# Patient Record
Sex: FEMALE | Race: BLACK OR AFRICAN AMERICAN | NOT HISPANIC OR LATINO | Employment: OTHER | URBAN - METROPOLITAN AREA
[De-identification: names, ages, dates, MRNs, and addresses within clinical notes are randomized per-mention and may not be internally consistent; named-entity substitution may affect disease eponyms.]

---

## 2021-10-28 ENCOUNTER — TELEPHONE (OUTPATIENT)
Dept: BARIATRICS | Facility: CLINIC | Age: 71
End: 2021-10-28
Payer: MEDICAID

## 2021-12-01 ENCOUNTER — TELEPHONE (OUTPATIENT)
Dept: NEUROSURGERY | Facility: CLINIC | Age: 71
End: 2021-12-01
Payer: MEDICAID

## 2021-12-09 ENCOUNTER — HOSPITAL ENCOUNTER (OUTPATIENT)
Dept: RADIOLOGY | Facility: OTHER | Age: 71
Discharge: HOME | End: 2021-12-09
Attending: STUDENT IN AN ORGANIZED HEALTH CARE EDUCATION/TRAINING PROGRAM
Payer: MEDICARE

## 2021-12-09 ENCOUNTER — TELEPHONE (OUTPATIENT)
Dept: SPINE | Facility: CLINIC | Age: 71
End: 2021-12-09

## 2021-12-09 ENCOUNTER — OFFICE VISIT (OUTPATIENT)
Dept: SPINE | Facility: CLINIC | Age: 71
End: 2021-12-09
Payer: MEDICARE

## 2021-12-09 DIAGNOSIS — M48.061 DEGENERATIVE LUMBAR SPINAL STENOSIS: ICD-10-CM

## 2021-12-09 DIAGNOSIS — M48.061 DEGENERATIVE LUMBAR SPINAL STENOSIS: Primary | ICD-10-CM

## 2021-12-09 PROCEDURE — 72082 XR SCOLIOSIS COMPLETE: ICD-10-PCS | Mod: 26,,, | Performed by: RADIOLOGY

## 2021-12-09 PROCEDURE — 72110 X-RAY EXAM L-2 SPINE 4/>VWS: CPT | Mod: TC,FY

## 2021-12-09 PROCEDURE — 99203 PR OFFICE/OUTPT VISIT, NEW, LEVL III, 30-44 MIN: ICD-10-PCS | Mod: S$GLB,,, | Performed by: STUDENT IN AN ORGANIZED HEALTH CARE EDUCATION/TRAINING PROGRAM

## 2021-12-09 PROCEDURE — 72082 X-RAY EXAM ENTIRE SPI 2/3 VW: CPT | Mod: TC,FY

## 2021-12-09 PROCEDURE — 72110 X-RAY EXAM L-2 SPINE 4/>VWS: CPT | Mod: 26,59,, | Performed by: RADIOLOGY

## 2021-12-09 PROCEDURE — 99213 OFFICE O/P EST LOW 20 MIN: CPT | Mod: PBBFAC | Performed by: STUDENT IN AN ORGANIZED HEALTH CARE EDUCATION/TRAINING PROGRAM

## 2021-12-09 PROCEDURE — 72082 X-RAY EXAM ENTIRE SPI 2/3 VW: CPT | Mod: 26,,, | Performed by: RADIOLOGY

## 2021-12-09 PROCEDURE — 99203 OFFICE O/P NEW LOW 30 MIN: CPT | Mod: S$GLB,,, | Performed by: STUDENT IN AN ORGANIZED HEALTH CARE EDUCATION/TRAINING PROGRAM

## 2021-12-09 PROCEDURE — 99999 PR PBB SHADOW E&M-EST. PATIENT-LVL III: ICD-10-PCS | Mod: PBBFAC,,, | Performed by: STUDENT IN AN ORGANIZED HEALTH CARE EDUCATION/TRAINING PROGRAM

## 2021-12-09 PROCEDURE — 99999 PR PBB SHADOW E&M-EST. PATIENT-LVL III: CPT | Mod: PBBFAC,,, | Performed by: STUDENT IN AN ORGANIZED HEALTH CARE EDUCATION/TRAINING PROGRAM

## 2021-12-09 PROCEDURE — 72110 XR LUMBAR SPINE 5 VIEW WITH FLEX AND EXT: ICD-10-PCS | Mod: 26,59,, | Performed by: RADIOLOGY

## 2021-12-09 RX ORDER — IBUPROFEN 800 MG/1
TABLET ORAL
COMMUNITY
Start: 2021-11-12

## 2021-12-09 RX ORDER — METOPROLOL SUCCINATE 50 MG/1
50 TABLET, EXTENDED RELEASE ORAL
COMMUNITY

## 2021-12-09 RX ORDER — TOBRAMYCIN 1.2 G/30ML
INJECTION, POWDER, LYOPHILIZED, FOR SOLUTION INTRAVENOUS
COMMUNITY
Start: 2021-07-22

## 2021-12-09 RX ORDER — TRAMADOL HYDROCHLORIDE 50 MG/1
TABLET ORAL
COMMUNITY
Start: 2021-10-01

## 2021-12-09 RX ORDER — AMLODIPINE BESYLATE 10 MG/1
TABLET ORAL
COMMUNITY
Start: 2021-07-06

## 2021-12-09 RX ORDER — HYDROCORTISONE 25 MG/G
CREAM TOPICAL
COMMUNITY
Start: 2021-07-29

## 2021-12-09 RX ORDER — DICLOFENAC SODIUM 10 MG/G
GEL TOPICAL
COMMUNITY
Start: 2021-10-01

## 2021-12-09 RX ORDER — FLUTICASONE PROPIONATE 50 MCG
SPRAY, SUSPENSION (ML) NASAL
COMMUNITY
Start: 2021-11-24

## 2021-12-09 RX ORDER — ACYCLOVIR 800 MG/1
TABLET ORAL
COMMUNITY
Start: 2021-07-29

## 2021-12-09 RX ORDER — HYDROCHLOROTHIAZIDE 25 MG/1
TABLET ORAL
COMMUNITY
Start: 2021-07-29

## 2021-12-09 RX ORDER — LOVASTATIN 20 MG/1
20 TABLET ORAL
COMMUNITY

## 2021-12-09 RX ORDER — MELOXICAM 7.5 MG/1
TABLET ORAL
COMMUNITY
Start: 2021-11-24

## 2021-12-09 RX ORDER — DICLOFENAC POTASSIUM 50 MG/1
50 TABLET, FILM COATED ORAL 3 TIMES DAILY PRN
COMMUNITY

## 2021-12-09 RX ORDER — DOXAZOSIN 2 MG/1
2 TABLET ORAL
COMMUNITY

## 2021-12-14 ENCOUNTER — TELEPHONE (OUTPATIENT)
Dept: BARIATRICS | Facility: CLINIC | Age: 71
End: 2021-12-14
Payer: MEDICAID

## 2021-12-29 ENCOUNTER — TELEPHONE (OUTPATIENT)
Dept: NEUROSURGERY | Facility: CLINIC | Age: 71
End: 2021-12-29
Payer: MEDICAID

## 2022-01-04 ENCOUNTER — CLINICAL SUPPORT (OUTPATIENT)
Dept: REHABILITATION | Facility: HOSPITAL | Age: 72
End: 2022-01-04
Attending: STUDENT IN AN ORGANIZED HEALTH CARE EDUCATION/TRAINING PROGRAM
Payer: MEDICAID

## 2022-01-04 DIAGNOSIS — M48.061 DEGENERATIVE LUMBAR SPINAL STENOSIS: ICD-10-CM

## 2022-01-04 DIAGNOSIS — R29.898 DECREASED STRENGTH OF LOWER EXTREMITY: ICD-10-CM

## 2022-01-04 DIAGNOSIS — M53.86 DECREASED RANGE OF MOTION OF LUMBAR SPINE: Primary | ICD-10-CM

## 2022-01-04 PROCEDURE — 97161 PT EVAL LOW COMPLEX 20 MIN: CPT | Mod: PO

## 2022-01-04 NOTE — PLAN OF CARE
[facility]  Physical Therapy Initial Evaluation    Date: 1/4/2022   Name: Vicky Marie  Clinic Number: [id number]    Therapy Diagnosis:   Encounter Diagnoses   Name Primary?    Degenerative lumbar spinal stenosis     Decreased range of motion of lumbar spine Yes    Decreased strength of lower extremity      Physician: Charlie Cobb DO    Physician Orders: PT Eval and Treat   Medical Diagnosis from Referral: M48.061 (ICD-10-CM) - Degenerative lumbar spinal stenosis  Evaluation Date: 1/4/2022  Authorization Period Expiration: 12/9/2022  Plan of Care Expiration: 3/4/2022  Visit # / Visits authorized: 1/1    Time In: 1257p  Time Out: 0138p  Total Appointment Time (timed & untimed codes): 45 minutes    Precautions: Standard    Subjective   Date of onset: years ago   History of current condition - Vicky reports: she has been having low back pain for approximately . Patient does report a specific mechanism of injury. Pt states she fell when a chair slipped out behind her. Pt states the pain runs down to the back of the R calf. Pt states that standing and walking increase the pain. Pt states that sitting it hurts sometimes too. Pt states the R leg seems a little weaker and he fingers have started to tingle recently. Easing factors include none. Patient denies any bowel or bladder incontinence. Pt states she has not had any falls since that one last year. Pt states she tried PT in the past but that did not help.      Medical History:   Past Medical History:   Diagnosis Date    Arthritis     COPD (chronic obstructive pulmonary disease)     Hypertension        Surgical History:   Vicky Marie  has a past surgical history that includes choleycystec and Hysterectomy.    Medications:   Vicky has a current medication list which includes the following prescription(s): acyclovir, amlodipine, diclofenac, diclofenac sodium, doxazosin, fluticasone propionate, hydrochlorothiazide,  hydrocortisone, ibuprofen, lovastatin, meloxicam, metoprolol succinate, tobramycin, and tramadol.    Allergies:   Review of patient's allergies indicates:   Allergen Reactions    Ciprofloxacin Hives    Flavoxate Hives    Nuts [tree nut]      PEANUTS    Sulfa (sulfonamide antibiotics) Hives    Trimethoprim Hives        Imaging:   (12/9/2021) Diagnostic Results:  MRI L from OSH: severe central canal stenosis at L3/4, moderate bilateral lateral recess stenosis secondary to facet arthropathy.  Severe central canal stenosis at L4/5 and severe right lateral recess stenosis.  Moderate left lateral recess stenosis.  Likely left synovial facet cyst.  L5/S1 mild central canal stenosis with bilateral moderate lateral recess stenosis.    Prior Therapy: Y, for the same problem  Social History: lives with family   Occupation: none  Prior Level of Function: I  Current Level of Function: I    Pain:  Current: 8/10;   Location: low back  Description: gnawing; cramping    Pt's goals: to walk better and to lose some weight   Objective   Observation: calm and cooperative      Gait:   SPC in the R hand   Decreased hip EXT   Decreased step length   Compensated trendelenburg to the R     Posture:     Anterior pelvic tilt   Increased lumbar lordosis   Hinge point with skin fold at lower lumbar spine    Lumbar Range of Motion:    Degrees Pain   Flexion 25% limited    No pain      Extension 50% limited    Pain    Left Side Bending 20 degrees  Pain along L side of back    Right Side Bending 20 degrees No pain     Lower Extremity Strength  Right LE  Left LE    Knee extension: 4/5 Knee extension: 4/5   Knee flexion: 4/5 Knee flexion: 4/5   Hip flexion: 3+/5 Hip flexion: 3+/5   Hip extension:  3+/5 Hip extension: 3+/5   Hip abduction: 4-/5 Hip abduction: 4-/5   Hip adduction: 4/5 Hip adduction 4/5   Ankle dorsiflexion: 5/5 Ankle dorsiflexion: 5/5   Ankle plantarflexion: 4+/5 Ankle plantarflexion: 4+/5       Special Tests:  - Flexion  preference: yes  - Extension preference: no  - SLR test: (+) RLE  - Slump test: (+) RLE  - Cross SLR test: NT  - Quadrant test: NT    Joint Mobility: not tested    Palpation: no tenderness to lumbar paraspinals     Sensation: WNL    Functional Testins chair rise --> 6 reps without AD or UE use.    SLB --> unable to perform      Limitation/Restriction for FOTO Lumbar Survey    Therapist reviewed FOTO scores for Vicky Marie on 2022.   FOTO documents entered into [facility] - see Media section.    Not tested this visit         TREATMENT   Treatment Time In: 128p  Treatment Time Out: 138p  Total Treatment time (time-based codes) separate from Evaluation: 10 minutes    Vicky received therapeutic exercises to develop strength, endurance, ROM, flexibility, posture, and core stabilization for 10 minutes including:   SL Clamshells 2 x 10    Standing Hip Ext leaning on mat x 10 B    Slump nerve glides 2 x 20 RLE only    Education - HEP/prognosis     Home Exercises and Patient Education Provided    Education provided:   - HEP  - Prognosis/POC    Written Home Exercises Provided: yes.  Exercises were reviewed and Vicky was able to demonstrate them prior to the end of the session.  Vicky demonstrated good  understanding of the education provided.     See EMR under Patient Instructions for exercises provided 2022.    Assessment   Vicky is a 71 y.o. female referred to outpatient Physical Therapy with a medical diagnosis of Degenerative lumbar spinal stenosis .Pt presents with painful and limited lumbar AROM, abnormal posture, adverse neural tension, BLE weakness, and functional limitations of poor gait mechanics and poor motor performance in the 30s chair rise. Pt reported decreased pain and demonstrated an improved gait pattern with less R trunk lean with the cane placement being switch from her R hand to her L hand. Pt presents with symptoms of lumbar stenosis given subjective and flexion bias. Pt's  increase weight and poor posture likely are exacerbating this issue. HEP provided to reduce neural tension and increase glute med strength. Patient would benefit from skilled PT to improve mobility, strength, and improve QOL.     Pt to be seen 2x/week for 8x weeks     Pt prognosis is Good.   Pt will benefit from skilled outpatient Physical Therapy to address the deficits stated above and in the chart below, provide pt/family education, and to maximize pt's level of independence.     Plan of care discussed with patient: Yes  Pt's spiritual, cultural and educational needs considered and patient is agreeable to the plan of care and goals as stated below:     Anticipated Barriers for therapy: chronicity of pt's symptoms     Medical Necessity is demonstrated by the following  History  Co-morbidities and personal factors that may impact the plan of care Co-morbidities:   COPD/asthma and HTN    Personal Factors:   no deficits     moderate   Examination  Body Structures and Functions, activity limitations and participation restrictions that may impact the plan of care Body Regions:   lower extremities  trunk    Body Systems:    gross symmetry  ROM  strength  gross coordinated movement  gait  transfers  motor control    Participation Restrictions:   n/a    Activity limitations:   no deficits    General Tasks and Commands  no deficits    Communication  no deficits    Mobility  walking    Self care  no deficits    Domestic Life  no deficits    Interactions/Relationships  no deficits    Life Areas  no deficits    Community and Social Life  community life  recreation and leisure         low   Clinical Presentation stable and uncomplicated low   Decision Making/ Complexity Score: low     Goals:  Short Term Goals: 4 weeks  1. Patient will be independent with HEP in order to supplement pain free lumbar ROM - PROGRESSING, NOT MET  2. Pt will report a negative on the slump test and SLR to demonstrate decreased adverse neural tension  - PROGRESSING, NOT MET  3. Patient will improve 30s chair rise to 9 reps to demo improved functional performance and endurance- PROGRESSING, NOT MET     Long Term Goals: 8 weeks   1. Pt will improve lumbar FOTO survey to </= predicted% limited in order to return to ADLs without limitation - PROGRESSING, NOT MET  2. Patient will improve 30s chair rise to 12 reps in order to further demonstrate improved motor performance - PROGRESSING, NOT MET  3. Pt will report less than 3/10 pain with walking to demonstrate improved walking tolerance. - PROGRESSING, NOT MET    Plan   Plan of care Certification: 1/4/2022 to 3/4/2021.    Outpatient Physical Therapy 2 times weekly for 8 weeks to include the following interventions: Electrical Stimulation ,, Gait Training, Manual Therapy, Moist Heat/ Ice, Neuromuscular Re-ed, Patient Education, Therapeutic Activities and Therapeutic Exercise.     Vazquez Oden, PT

## 2022-02-04 ENCOUNTER — DOCUMENTATION ONLY (OUTPATIENT)
Dept: REHABILITATION | Facility: HOSPITAL | Age: 72
End: 2022-02-04
Payer: MEDICAID

## 2022-02-04 NOTE — PROGRESS NOTES
PT called patient after patient had not arrived for 10 minutes after visit was scheduled to begin.   Communication: Spoke with patient, to which patient would like all of her appointments cancelled. Patient has no-showed/cancelled last 6 appointments.   Notes: Remaining appointments cancelled on 2/4/2022.    Yaritza Langley PT,DPT, OCS  License Number: [id number]

## 2022-02-17 ENCOUNTER — TELEPHONE (OUTPATIENT)
Dept: NEUROSURGERY | Facility: CLINIC | Age: 72
End: 2022-02-17
Payer: MEDICAID

## 2022-02-17 NOTE — TELEPHONE ENCOUNTER
Called patient and she needs to postpone appt until March. REscheduled as below agreed by patient.    Future Appointments   Date Time Provider Department Center   3/31/2022 11:00 AM Charlie Cobb,  [facility]     ----- Message from Pedro Dyson sent at 2/17/2022  9:54 AM CST -----      Name of Who is Calling: LUIS EDUARDO PANG [[id number]]      What is the request in detail: Pt called to reschedule appt.Please contact to further discuss and advise.          Can the clinic reply by [facility]: N      What Number [facility] Back [facility]: [phone]

## 2022-03-31 ENCOUNTER — OFFICE VISIT (OUTPATIENT)
Dept: SPINE | Facility: CLINIC | Age: 72
End: 2022-03-31
Payer: MEDICARE

## 2022-03-31 VITALS
SYSTOLIC BLOOD PRESSURE: 138 MMHG | HEIGHT: 62 IN | WEIGHT: 293 LBS | BODY MASS INDEX: 53.92 KG/M2 | DIASTOLIC BLOOD PRESSURE: 80 MMHG | HEART RATE: 58 BPM

## 2022-03-31 DIAGNOSIS — M48.061 DEGENERATIVE LUMBAR SPINAL STENOSIS: Primary | ICD-10-CM

## 2022-03-31 PROCEDURE — 1159F PR MEDICATION LIST DOCUMENTED IN MEDICAL RECORD: ICD-10-PCS | Mod: CPTII,S$GLB,, | Performed by: STUDENT IN AN ORGANIZED HEALTH CARE EDUCATION/TRAINING PROGRAM

## 2022-03-31 PROCEDURE — 99213 OFFICE O/P EST LOW 20 MIN: CPT | Mod: PBBFAC | Performed by: STUDENT IN AN ORGANIZED HEALTH CARE EDUCATION/TRAINING PROGRAM

## 2022-03-31 PROCEDURE — 3288F FALL RISK ASSESSMENT DOCD: CPT | Mod: CPTII,S$GLB,, | Performed by: STUDENT IN AN ORGANIZED HEALTH CARE EDUCATION/TRAINING PROGRAM

## 2022-03-31 PROCEDURE — 1101F PR PT FALLS ASSESS DOC 0-1 FALLS W/OUT INJ PAST YR: ICD-10-PCS | Mod: CPTII,S$GLB,, | Performed by: STUDENT IN AN ORGANIZED HEALTH CARE EDUCATION/TRAINING PROGRAM

## 2022-03-31 PROCEDURE — 1125F PR PAIN SEVERITY QUANTIFIED, PAIN PRESENT: ICD-10-PCS | Mod: CPTII,S$GLB,, | Performed by: STUDENT IN AN ORGANIZED HEALTH CARE EDUCATION/TRAINING PROGRAM

## 2022-03-31 PROCEDURE — 99213 PR OFFICE/OUTPT VISIT, EST, LEVL III, 20-29 MIN: ICD-10-PCS | Mod: S$GLB,,, | Performed by: STUDENT IN AN ORGANIZED HEALTH CARE EDUCATION/TRAINING PROGRAM

## 2022-03-31 PROCEDURE — 3079F DIAST BP 80-89 MM HG: CPT | Mod: CPTII,S$GLB,, | Performed by: STUDENT IN AN ORGANIZED HEALTH CARE EDUCATION/TRAINING PROGRAM

## 2022-03-31 PROCEDURE — 1101F PT FALLS ASSESS-DOCD LE1/YR: CPT | Mod: CPTII,S$GLB,, | Performed by: STUDENT IN AN ORGANIZED HEALTH CARE EDUCATION/TRAINING PROGRAM

## 2022-03-31 PROCEDURE — 3075F SYST BP GE 130 - 139MM HG: CPT | Mod: CPTII,S$GLB,, | Performed by: STUDENT IN AN ORGANIZED HEALTH CARE EDUCATION/TRAINING PROGRAM

## 2022-03-31 PROCEDURE — 3008F PR BODY MASS INDEX (BMI) DOCUMENTED: ICD-10-PCS | Mod: CPTII,S$GLB,, | Performed by: STUDENT IN AN ORGANIZED HEALTH CARE EDUCATION/TRAINING PROGRAM

## 2022-03-31 PROCEDURE — 3288F PR FALLS RISK ASSESSMENT DOCUMENTED: ICD-10-PCS | Mod: CPTII,S$GLB,, | Performed by: STUDENT IN AN ORGANIZED HEALTH CARE EDUCATION/TRAINING PROGRAM

## 2022-03-31 PROCEDURE — 1159F MED LIST DOCD IN RCRD: CPT | Mod: CPTII,S$GLB,, | Performed by: STUDENT IN AN ORGANIZED HEALTH CARE EDUCATION/TRAINING PROGRAM

## 2022-03-31 PROCEDURE — 3079F PR MOST RECENT DIASTOLIC BLOOD PRESSURE 80-89 MM HG: ICD-10-PCS | Mod: CPTII,S$GLB,, | Performed by: STUDENT IN AN ORGANIZED HEALTH CARE EDUCATION/TRAINING PROGRAM

## 2022-03-31 PROCEDURE — 99999 PR PBB SHADOW E&M-EST. PATIENT-LVL III: ICD-10-PCS | Mod: PBBFAC,,, | Performed by: STUDENT IN AN ORGANIZED HEALTH CARE EDUCATION/TRAINING PROGRAM

## 2022-03-31 PROCEDURE — 99999 PR PBB SHADOW E&M-EST. PATIENT-LVL III: CPT | Mod: PBBFAC,,, | Performed by: STUDENT IN AN ORGANIZED HEALTH CARE EDUCATION/TRAINING PROGRAM

## 2022-03-31 PROCEDURE — 1125F AMNT PAIN NOTED PAIN PRSNT: CPT | Mod: CPTII,S$GLB,, | Performed by: STUDENT IN AN ORGANIZED HEALTH CARE EDUCATION/TRAINING PROGRAM

## 2022-03-31 PROCEDURE — 3008F BODY MASS INDEX DOCD: CPT | Mod: CPTII,S$GLB,, | Performed by: STUDENT IN AN ORGANIZED HEALTH CARE EDUCATION/TRAINING PROGRAM

## 2022-03-31 PROCEDURE — 3075F PR MOST RECENT SYSTOLIC BLOOD PRESS GE 130-139MM HG: ICD-10-PCS | Mod: CPTII,S$GLB,, | Performed by: STUDENT IN AN ORGANIZED HEALTH CARE EDUCATION/TRAINING PROGRAM

## 2022-03-31 PROCEDURE — 99213 OFFICE O/P EST LOW 20 MIN: CPT | Mod: S$GLB,,, | Performed by: STUDENT IN AN ORGANIZED HEALTH CARE EDUCATION/TRAINING PROGRAM

## 2022-03-31 NOTE — PROGRESS NOTES
Neurosurgery  Established Patient    SUBJECTIVE:     History of Present Illness:  71 F presents for eval of back and RLE leg pain that has worsened over the past year.  Back and leg symptoms are similar in severity.  Her leg pain radiates down right buttock into right posterior calf.  She has no left leg pain.  Laying flat and walking exacerbate the the back and RLE pain.  No positions improve the back pain.  She hasn't tried PT or pain management yet.  She is a nonsmoker.  No bowel/bladder incontinence.  She denies dropping items from hands or UE radiculopathy.    Interval fu 3/31/2022: Pt has seen pain mgmt and had JENNIFER with resolution of her back and RLE radicular pain.  She still has right knee pain which she is seeing pain mgmt for.  She started PT but only went to a few sessions and stopped after her daughter was diagnosed with cancer.    Review of patient's allergies indicates:   Allergen Reactions    Ciprofloxacin Hives    Flavoxate Hives    Nuts [tree nut]      PEANUTS    Sulfa (sulfonamide antibiotics) Hives    Trimethoprim Hives       Current Outpatient Medications   Medication Sig Dispense Refill    acyclovir (ZOVIRAX) 800 MG Tab       amLODIPine (NORVASC) 10 MG tablet       diclofenac (CATAFLAM) 50 MG tablet Take 50 mg by mouth 3 (three) times daily as needed.      diclofenac sodium (VOLTAREN) 1 % Gel       doxazosin (CARDURA) 2 MG tablet Take 2 mg by mouth.      fluticasone propionate (FLONASE) 50 mcg/actuation nasal spray       hydroCHLOROthiazide (HYDRODIURIL) 25 MG tablet       hydrocortisone 2.5 % cream       ibuprofen (ADVIL,MOTRIN) 800 MG tablet       lovastatin (MEVACOR) 20 MG tablet Take 20 mg by mouth.      meloxicam (MOBIC) 7.5 MG tablet       metoprolol succinate (TOPROL-XL) 50 MG 24 hr tablet Take 50 mg by mouth.      tobramycin (NEBCIN) 1.2 gram injection       traMADoL (ULTRAM) 50 mg tablet        No current facility-administered medications for this visit.       Past  "Medical History:   Diagnosis Date    Arthritis     COPD (chronic obstructive pulmonary disease)     Hypertension      Past Surgical History:   Procedure Laterality Date    choleycystec      HYSTERECTOMY       Family History    None       Social History     Socioeconomic History    Marital status: Unknown   Tobacco Use    Smoking status: Former Smoker     Types: Cigarettes     Quit date: 2007     Years since quitting: 15.2    Smokeless tobacco: Never Used   Substance and Sexual Activity    Alcohol use: Never       Review of Systems   14 point ROS was negative    OBJECTIVE:     Vital Signs  Pulse: (!) 58  BP: 138/80  Pain Score:   9  Height: 5' 2" (157.5 cm)  Weight: 135.2 kg (298 lb 1 oz)  Body mass index is 54.52 kg/m².    Neurosurgery Physical Exam  Constitutional: She appears well-developed and well-nourished.      Eyes: Pupils are equal, round, and reactive to light.      Cardiovascular: Normal rate and regular rhythm.      Abdominal: Soft.     Psych/Behavior: She is alert. She is oriented to person, place, and time. She has a normal mood and affect.     Musculoskeletal: Gait is abnormal.        Neck: Range of motion is full.        Back: Range of motion is limited.        Right Upper Extremities: Muscle strength is 5/5.        Left Upper Extremities: Muscle strength is 5/5.       Right Lower Extremities: Muscle strength is 5/5.        Left Lower Extremities: Muscle strength is 5/5.     Neurological:        Coordination: She has abnormal tandem walking coordination. She has a normal Romberg Test.        Sensory: There is no sensory deficit in the trunk. There is no sensory deficit in the extremities.        DTRs: DTRs are DTRS NORMAL AND SYMMETRICnormal and symmetric.        Cranial nerves: Cranial nerve(s) II, III, IV, V, VI, VII, VIII, IX, X, XI and XII are intact.      Morbid obesity     Trace whitley's bilaterally     TTP throughout posterior spine and bilateral lumbosacral jxn.  Diagnostic " Results:  Flex/ex L: spondy at L3/4, 4/5 grade I.  Unchanged on dynamic views.  Scoliosis: LL of 37 deg, PI of 57 deg, PT 27 deg, SVA of 8 cm  CT L: ankylosing spondylitis of thoracic spine.  Facet arthropathy right greater than left from L3/4 to 5/S1.  Reviewed    ASSESSMENT/PLAN:     71 F with severe central canal stenosis at L3/4, 4/5, right L5 radiculopathy and axial back pain.  Her back and RLE radicular pain have improved with pain mgmt.  She hasn't completed PT yet.  She is also interested in losing weight and has tried to get appt with weight loss physician but has been unsuccessful.  -Bariatric medicine referral  -Fu in 12 weeks  -Continue PT

## 2023-09-01 ENCOUNTER — OFFICE VISIT (OUTPATIENT)
Dept: OTOLARYNGOLOGY | Facility: CLINIC | Age: 73
End: 2023-09-01
Payer: MEDICARE

## 2023-09-01 ENCOUNTER — CLINICAL SUPPORT (OUTPATIENT)
Dept: AUDIOLOGY | Facility: CLINIC | Age: 73
End: 2023-09-01
Payer: MEDICARE

## 2023-09-01 DIAGNOSIS — H90.3 ASYMMETRICAL SENSORINEURAL HEARING LOSS: Primary | ICD-10-CM

## 2023-09-01 DIAGNOSIS — H93.12 TINNITUS OF LEFT EAR: Primary | ICD-10-CM

## 2023-09-01 DIAGNOSIS — H93.12 TINNITUS OF LEFT EAR: ICD-10-CM

## 2023-09-01 DIAGNOSIS — H93.A2 PULSATILE TINNITUS, LEFT EAR: ICD-10-CM

## 2023-09-01 DIAGNOSIS — H65.92 FLUID LEVEL BEHIND TYMPANIC MEMBRANE OF LEFT EAR: ICD-10-CM

## 2023-09-01 PROCEDURE — 99999 PR PBB SHADOW E&M-EST. PATIENT-LVL III: ICD-10-PCS | Mod: PBBFAC,,, | Performed by: OTOLARYNGOLOGY

## 2023-09-01 PROCEDURE — 99999 PR PBB SHADOW E&M-EST. PATIENT-LVL I: CPT | Mod: PBBFAC,,,

## 2023-09-01 PROCEDURE — 92557 PR COMPREHENSIVE HEARING TEST: ICD-10-PCS | Mod: S$GLB,,,

## 2023-09-01 PROCEDURE — 99203 PR OFFICE/OUTPT VISIT, NEW, LEVL III, 30-44 MIN: ICD-10-PCS | Mod: S$GLB,,, | Performed by: OTOLARYNGOLOGY

## 2023-09-01 PROCEDURE — 1101F PR PT FALLS ASSESS DOC 0-1 FALLS W/OUT INJ PAST YR: ICD-10-PCS | Mod: CPTII,S$GLB,, | Performed by: OTOLARYNGOLOGY

## 2023-09-01 PROCEDURE — 3288F FALL RISK ASSESSMENT DOCD: CPT | Mod: CPTII,S$GLB,, | Performed by: OTOLARYNGOLOGY

## 2023-09-01 PROCEDURE — 92567 TYMPANOMETRY: CPT | Mod: S$GLB,,,

## 2023-09-01 PROCEDURE — 1159F MED LIST DOCD IN RCRD: CPT | Mod: CPTII,S$GLB,, | Performed by: OTOLARYNGOLOGY

## 2023-09-01 PROCEDURE — 1101F PT FALLS ASSESS-DOCD LE1/YR: CPT | Mod: CPTII,S$GLB,, | Performed by: OTOLARYNGOLOGY

## 2023-09-01 PROCEDURE — 1159F PR MEDICATION LIST DOCUMENTED IN MEDICAL RECORD: ICD-10-PCS | Mod: CPTII,S$GLB,, | Performed by: OTOLARYNGOLOGY

## 2023-09-01 PROCEDURE — 92557 COMPREHENSIVE HEARING TEST: CPT | Mod: S$GLB,,,

## 2023-09-01 PROCEDURE — 99999 PR PBB SHADOW E&M-EST. PATIENT-LVL III: CPT | Mod: PBBFAC,,, | Performed by: OTOLARYNGOLOGY

## 2023-09-01 PROCEDURE — 92567 PR TYMPA2METRY: ICD-10-PCS | Mod: S$GLB,,,

## 2023-09-01 PROCEDURE — 99999 PR PBB SHADOW E&M-EST. PATIENT-LVL I: ICD-10-PCS | Mod: PBBFAC,,,

## 2023-09-01 PROCEDURE — 3288F PR FALLS RISK ASSESSMENT DOCUMENTED: ICD-10-PCS | Mod: CPTII,S$GLB,, | Performed by: OTOLARYNGOLOGY

## 2023-09-01 PROCEDURE — 99203 OFFICE O/P NEW LOW 30 MIN: CPT | Mod: S$GLB,,, | Performed by: OTOLARYNGOLOGY

## 2023-09-01 NOTE — PROGRESS NOTES
Vicky Marie, a 72 y.o. female, was seen today in the clinic for an audiologic evaluation.  Per review of medical record, an audiogram completed at an outside facility in 2015 revealed mild sensorineural hearing loss at 4-8kHz in the right ear and mild to moderate sensorineural hearing loss in the left ear. Today patient reported continued difficulty hearing, with the left ear being worse. She also reported constant pulsing/humming tinnitus in the left ear.  She reported a prior history of otologic issues with the left ear and continues to have occasional otalgia in the left ear. Of note, in her appointment request for today's visit she noted echoing, though today she clarified that she was trying to describe her tinnitus. Patient denied autophony, echoing of external sound, and dizziness.    Tympanometry revealed Type A in the right ear and Type B with normal ear canal volume in the left ear. Acoustic reflexes were not tested this date due to difficulty maintaining a hermetic seal in the left ear.  Audiogram results revealed normal hearing sensitivity through 3kHz sloping to moderate sensorineural hearing loss by 8kHz in the right ear, and mild to moderately severe sensorineural hearing loss in the left ear.  Speech reception thresholds were noted at 15 dB in the right ear and 25 dB in the left ear.  Speech discrimination scores were 100% in the right ear and 100% in the left ear.    Recommendations:  Otologic evaluation  Annual audiogram, or sooner if changes are noted.  Hearing protection when in noise  Hearing aid consultation at patient discretion and pending medical clearance, if difficulties persist.

## 2023-09-01 NOTE — PROGRESS NOTES
Subjective     Patient ID: Vicky Marie is a 72 y.o. female.    Chief Complaint: Tinnitus    Ringing in Ears:    Associated symptoms: Ear pain and tinnitus.      Ms. Marie is a 72 year-old female who presents for evaluation for left-sided tinnitus and hearing loss. Ongoing for a year. History of tympanic membrane perforation on the left as a child that has since resolved. Used to have left purulent otorrhea but no longer. Feels like pulsatile tinnitus. Intermittent otalgia along the outer ear canal site.     Review of Systems   HENT:  Positive for ear pain, hearing loss and tinnitus.       Objective     Physical Exam  In no acute distress  Right ear: EAC normal, TM translucent, no ONDINA  Left ear: EAC narrowed, TM with scattered myringosclerosis, able to see incus, unable to see middle ear space    I personally reviewed the audiogram that was completed on 9/1/23 with the following findings: mild SNHL, left worse than right, type B tymp on the left       Assessment and Plan     1. Tinnitus of left ear  -     Ambulatory referral/consult to Audiology; Future; Expected date: 09/05/2023    2. Fluid level behind tympanic membrane of left ear  -     CT Temporal Bone without contrast; Future; Expected date: 09/01/2023    Audiogram reviewed with patient. Unable to rule out middle ear effusion versus granulation tissue on the left. Will obtain CT temporal bone and RTC to discuss results.

## 2023-09-11 ENCOUNTER — TELEPHONE (OUTPATIENT)
Dept: OTOLARYNGOLOGY | Facility: CLINIC | Age: 73
End: 2023-09-11
Payer: MEDICARE

## 2023-09-22 ENCOUNTER — OFFICE VISIT (OUTPATIENT)
Dept: OTOLARYNGOLOGY | Facility: CLINIC | Age: 73
End: 2023-09-22
Payer: MEDICARE

## 2023-09-22 DIAGNOSIS — H93.12 TINNITUS OF LEFT EAR: Primary | ICD-10-CM

## 2023-09-22 DIAGNOSIS — H65.92 FLUID LEVEL BEHIND TYMPANIC MEMBRANE OF LEFT EAR: ICD-10-CM

## 2023-09-22 DIAGNOSIS — H74.8X2 TYPE B TYMPANOGRAM, LEFT: ICD-10-CM

## 2023-09-22 PROCEDURE — 1101F PR PT FALLS ASSESS DOC 0-1 FALLS W/OUT INJ PAST YR: ICD-10-PCS | Mod: CPTII,S$GLB,, | Performed by: OTOLARYNGOLOGY

## 2023-09-22 PROCEDURE — 99999 PR PBB SHADOW E&M-EST. PATIENT-LVL III: ICD-10-PCS | Mod: PBBFAC,,, | Performed by: OTOLARYNGOLOGY

## 2023-09-22 PROCEDURE — 3288F FALL RISK ASSESSMENT DOCD: CPT | Mod: CPTII,S$GLB,, | Performed by: OTOLARYNGOLOGY

## 2023-09-22 PROCEDURE — 69433 CREATE EARDRUM OPENING: CPT | Mod: LT,S$GLB,, | Performed by: OTOLARYNGOLOGY

## 2023-09-22 PROCEDURE — 69433 PR CREATE EARDRUM OPENING,LOCAL ANESTH: ICD-10-PCS | Mod: LT,S$GLB,, | Performed by: OTOLARYNGOLOGY

## 2023-09-22 PROCEDURE — 3288F PR FALLS RISK ASSESSMENT DOCUMENTED: ICD-10-PCS | Mod: CPTII,S$GLB,, | Performed by: OTOLARYNGOLOGY

## 2023-09-22 PROCEDURE — 1126F AMNT PAIN NOTED NONE PRSNT: CPT | Mod: CPTII,S$GLB,, | Performed by: OTOLARYNGOLOGY

## 2023-09-22 PROCEDURE — 1159F MED LIST DOCD IN RCRD: CPT | Mod: CPTII,S$GLB,, | Performed by: OTOLARYNGOLOGY

## 2023-09-22 PROCEDURE — 1101F PT FALLS ASSESS-DOCD LE1/YR: CPT | Mod: CPTII,S$GLB,, | Performed by: OTOLARYNGOLOGY

## 2023-09-22 PROCEDURE — 99213 PR OFFICE/OUTPT VISIT, EST, LEVL III, 20-29 MIN: ICD-10-PCS | Mod: 25,S$GLB,, | Performed by: OTOLARYNGOLOGY

## 2023-09-22 PROCEDURE — 99999 PR PBB SHADOW E&M-EST. PATIENT-LVL III: CPT | Mod: PBBFAC,,, | Performed by: OTOLARYNGOLOGY

## 2023-09-22 PROCEDURE — 1159F PR MEDICATION LIST DOCUMENTED IN MEDICAL RECORD: ICD-10-PCS | Mod: CPTII,S$GLB,, | Performed by: OTOLARYNGOLOGY

## 2023-09-22 PROCEDURE — 1126F PR PAIN SEVERITY QUANTIFIED, NO PAIN PRESENT: ICD-10-PCS | Mod: CPTII,S$GLB,, | Performed by: OTOLARYNGOLOGY

## 2023-09-22 PROCEDURE — 99213 OFFICE O/P EST LOW 20 MIN: CPT | Mod: 25,S$GLB,, | Performed by: OTOLARYNGOLOGY

## 2023-09-23 ENCOUNTER — NURSE TRIAGE (OUTPATIENT)
Dept: ADMINISTRATIVE | Facility: CLINIC | Age: 73
End: 2023-09-23
Payer: MEDICARE

## 2023-09-23 RX ORDER — NEOMYCIN SULFATE, POLYMYXIN B SULFATE AND HYDROCORTISONE 10; 3.5; 1 MG/ML; MG/ML; [USP'U]/ML
3 SUSPENSION/ DROPS AURICULAR (OTIC) 3 TIMES DAILY
Qty: 10 ML | Refills: 0 | Status: SHIPPED | OUTPATIENT
Start: 2023-09-23

## 2023-09-23 RX ORDER — NEOMYCIN SULFATE, POLYMYXIN B SULFATE, BACITRACIN ZINC, HYDROCORTISONE 3.5; 10000; 400; 1 MG/G; [USP'U]/G; [USP'U]/G; MG/G
OINTMENT OPHTHALMIC 2 TIMES DAILY
Refills: 0 | OUTPATIENT
Start: 2023-09-23

## 2023-09-23 NOTE — PROGRESS NOTES
Subjective     Patient ID: Vicky Marie is a 72 y.o. female.    Chief Complaint: Results (Ct scan )    Ringing in Ears:    Associated symptoms: Ear pain and tinnitus.      Ms. Marie is a 72 year-old female who presents for evaluation for left-sided tinnitus and hearing loss. Ongoing for a year. History of tympanic membrane perforation on the left as a child that has since resolved. Used to have left purulent otorrhea but no longer. Feels like pulsatile tinnitus. Intermittent otalgia along the outer ear canal site.       9/22/23: here to follow up after CT scan.  No change in symptoms.  Reports left whooshing sound that is bothersome.     Review of Systems   HENT:  Positive for ear pain, hearing loss and tinnitus.       Objective     Physical Exam  In no acute distress  Right ear: EAC normal, TM translucent, no ONDINA  Left ear: EAC narrowed, TM with scattered myringosclerosis, able to see incus, unable to see middle ear space    I personally reviewed the audiogram that was completed on 9/1/23 with the following findings: mild SNHL, left worse than right, type B tymp on the left           CT temporal bones image independently reviewed by me and show: irregular soft tissue density surrounding epitympanic portion of ossicles extending into mastoid.  No ossicular erosion to indicate definite cholesteatoma. Tegmen appears intact    Procedure: left myringotomy with PE tube placement  After informed consent regarding infection, perforation, early extrusion and possible cholesteatoma formation the patient was brought to the minor procedure room and placed in the supine position. The operating microscope was then brought onto the field and the tympanic membrane was visualized. Using a sterile, single use phenol kit the TM was sterilized and anesthetized. A myringotomy incision was made and the effusion evacuated. A sterile Pena V tympanostomy tube was placed and the procedure was terminated. The patient tolerated  the procedure well.      Water precautions discussed. RTC as directed.        Assessment and Plan     1. Tinnitus of left ear    2. Type b tympanogram, left    3. Fluid level behind tympanic membrane of left ear    Other orders  -     neomycin-polymyxin-hydrocortisone (CORTISPORIN) 3.5-10,000-1 mg/mL-unit/mL-% otic suspension; Place 3 drops into the left ear 3 (three) times daily.  Dispense: 10 mL; Refill: 0      PE tube placed  F/u 1 mo if no improvement consider MRI for further evaluation of soft tissue density seen on CT

## 2023-09-23 NOTE — TELEPHONE ENCOUNTER
Pt reports having a tube placed in her ear yesterday. She was expecting a prescription for ear drops to be send to her pharmacy. No prescription has been sent. She is also having pain in her ear (8/10). Last dose of ibuprofen was last night at 2200. She feels that the pain is the same as it was yesterday. Contacted Dr. Alvarado, on call, who will reach out to Dr. Eubanks regarding the prescription. I relayed the information to the pt. Advised to alternate tylenol and ibuprofen as needed to manage the pain.     Reason for Disposition   [1] Prescription not at pharmacy AND [2] was prescribed by PCP recently  (Exception: [facility] has access to EMR and prescription is recorded there. Go to Home Care and confirm for pharmacy.)    Additional Information   Negative: [1] Prescription refill request for ESSENTIAL medicine (i.e., likelihood of harm to patient if not taken) AND [2] [facility] unable to refill per department policy    Protocols used: Medication Refill and Renewal Call-A-

## 2023-09-23 NOTE — TELEPHONE ENCOUNTER
Dr. Eubanks placed an order for ear drops and prescription was sent to pt's preferred pharmacy. Contacted pt and relayed information. Advised to call back if she has any further issues.

## 2023-12-08 ENCOUNTER — OFFICE VISIT (OUTPATIENT)
Dept: OTOLARYNGOLOGY | Facility: CLINIC | Age: 73
End: 2023-12-08
Payer: MEDICARE

## 2023-12-08 DIAGNOSIS — H93.12 TINNITUS OF LEFT EAR: Primary | ICD-10-CM

## 2023-12-08 PROCEDURE — 3288F FALL RISK ASSESSMENT DOCD: CPT | Mod: CPTII,S$GLB,, | Performed by: OTOLARYNGOLOGY

## 2023-12-08 PROCEDURE — 3044F HG A1C LEVEL LT 7.0%: CPT | Mod: CPTII,S$GLB,, | Performed by: OTOLARYNGOLOGY

## 2023-12-08 PROCEDURE — 99999 PR PBB SHADOW E&M-EST. PATIENT-LVL III: CPT | Mod: PBBFAC,,, | Performed by: OTOLARYNGOLOGY

## 2023-12-08 PROCEDURE — 4010F PR ACE/ARB THEARPY RXD/TAKEN: ICD-10-PCS | Mod: CPTII,S$GLB,, | Performed by: OTOLARYNGOLOGY

## 2023-12-08 PROCEDURE — 4010F ACE/ARB THERAPY RXD/TAKEN: CPT | Mod: CPTII,S$GLB,, | Performed by: OTOLARYNGOLOGY

## 2023-12-08 PROCEDURE — 1159F PR MEDICATION LIST DOCUMENTED IN MEDICAL RECORD: ICD-10-PCS | Mod: CPTII,S$GLB,, | Performed by: OTOLARYNGOLOGY

## 2023-12-08 PROCEDURE — 1126F PR PAIN SEVERITY QUANTIFIED, NO PAIN PRESENT: ICD-10-PCS | Mod: CPTII,S$GLB,, | Performed by: OTOLARYNGOLOGY

## 2023-12-08 PROCEDURE — 3044F PR MOST RECENT HEMOGLOBIN A1C LEVEL <7.0%: ICD-10-PCS | Mod: CPTII,S$GLB,, | Performed by: OTOLARYNGOLOGY

## 2023-12-08 PROCEDURE — 3288F PR FALLS RISK ASSESSMENT DOCUMENTED: ICD-10-PCS | Mod: CPTII,S$GLB,, | Performed by: OTOLARYNGOLOGY

## 2023-12-08 PROCEDURE — 1101F PR PT FALLS ASSESS DOC 0-1 FALLS W/OUT INJ PAST YR: ICD-10-PCS | Mod: CPTII,S$GLB,, | Performed by: OTOLARYNGOLOGY

## 2023-12-08 PROCEDURE — 1101F PT FALLS ASSESS-DOCD LE1/YR: CPT | Mod: CPTII,S$GLB,, | Performed by: OTOLARYNGOLOGY

## 2023-12-08 PROCEDURE — 99212 OFFICE O/P EST SF 10 MIN: CPT | Mod: S$GLB,,, | Performed by: OTOLARYNGOLOGY

## 2023-12-08 PROCEDURE — 99999 PR PBB SHADOW E&M-EST. PATIENT-LVL III: ICD-10-PCS | Mod: PBBFAC,,, | Performed by: OTOLARYNGOLOGY

## 2023-12-08 PROCEDURE — 1126F AMNT PAIN NOTED NONE PRSNT: CPT | Mod: CPTII,S$GLB,, | Performed by: OTOLARYNGOLOGY

## 2023-12-08 PROCEDURE — 1159F MED LIST DOCD IN RCRD: CPT | Mod: CPTII,S$GLB,, | Performed by: OTOLARYNGOLOGY

## 2023-12-08 PROCEDURE — 99212 PR OFFICE/OUTPT VISIT, EST, LEVL II, 10-19 MIN: ICD-10-PCS | Mod: S$GLB,,, | Performed by: OTOLARYNGOLOGY

## 2023-12-08 NOTE — PROGRESS NOTES
Subjective     Patient ID: Vicky Marie is a 73 y.o. female.    Chief Complaint: Follow-up    Follow-up  Pertinent negatives include no chest pain, chills, fever or sore throat.        Vicky Marie is a 73 y.o. female 1 mo s/p AS PE tube placement.  Reports ear feels better denies any ear fullness, hearing loss or tinnitus. Notes occasional ear ache.    Review of Systems   Constitutional:  Negative for chills and fever.   HENT:  Negative for sore throat and trouble swallowing.    Respiratory:  Negative for apnea and chest tightness.    Cardiovascular:  Negative for chest pain.          Objective     Physical Exam  Vitals and nursing note reviewed.   Constitutional:       Appearance: Normal appearance.   HENT:      Head: Normocephalic and atraumatic.      Right Ear: Tympanic membrane, ear canal and external ear normal. There is no impacted cerumen.      Left Ear: Tympanic membrane, ear canal and external ear normal. There is no impacted cerumen. A PE tube is present.   Neurological:      Mental Status: She is alert.       Data Reviewed:         Assessment and Plan     1. Tinnitus of left ear        Tinnitus resolved with PE tube placement  F/u in 6 mo          No follow-ups on file.